# Patient Record
Sex: MALE | Race: WHITE | NOT HISPANIC OR LATINO
[De-identification: names, ages, dates, MRNs, and addresses within clinical notes are randomized per-mention and may not be internally consistent; named-entity substitution may affect disease eponyms.]

---

## 2019-10-15 PROBLEM — Z00.00 ENCOUNTER FOR PREVENTIVE HEALTH EXAMINATION: Status: ACTIVE | Noted: 2019-10-15

## 2019-10-17 ENCOUNTER — APPOINTMENT (OUTPATIENT)
Dept: SURGERY | Facility: CLINIC | Age: 64
End: 2019-10-17
Payer: MEDICAID

## 2019-10-17 VITALS
TEMPERATURE: 98.3 F | DIASTOLIC BLOOD PRESSURE: 70 MMHG | OXYGEN SATURATION: 98 % | SYSTOLIC BLOOD PRESSURE: 129 MMHG | HEIGHT: 67 IN | RESPIRATION RATE: 15 BRPM | HEART RATE: 78 BPM | WEIGHT: 167 LBS | BODY MASS INDEX: 26.21 KG/M2

## 2019-10-17 DIAGNOSIS — Z78.9 OTHER SPECIFIED HEALTH STATUS: ICD-10-CM

## 2019-10-17 DIAGNOSIS — I10 ESSENTIAL (PRIMARY) HYPERTENSION: ICD-10-CM

## 2019-10-17 DIAGNOSIS — Z82.49 FAMILY HISTORY OF ISCHEMIC HEART DISEASE AND OTHER DISEASES OF THE CIRCULATORY SYSTEM: ICD-10-CM

## 2019-10-17 DIAGNOSIS — Z81.8 FAMILY HISTORY OF OTHER MENTAL AND BEHAVIORAL DISORDERS: ICD-10-CM

## 2019-10-17 PROCEDURE — 99204 OFFICE O/P NEW MOD 45 MIN: CPT

## 2019-10-17 RX ORDER — HYDROCHLOROTHIAZIDE 12.5 MG/1
TABLET ORAL
Refills: 0 | Status: ACTIVE | COMMUNITY

## 2019-10-17 RX ORDER — LISINOPRIL 20 MG/1
20 TABLET ORAL
Refills: 0 | Status: ACTIVE | COMMUNITY

## 2019-10-17 NOTE — CONSULT LETTER
[Consult Letter:] : I had the pleasure of evaluating your patient, [unfilled]. [Dear  ___] : Dear  [unfilled], [FreeTextEntry3] : Magdaleno Agudelo M.D. [Please see my note below.] : Please see my note below. [Sincerely,] : Sincerely,

## 2019-10-17 NOTE — ASSESSMENT
[FreeTextEntry1] : I have asked this patient to obtain a dynamic sonogram of the right groin to rule out an occult inguinal hernia.  The small subcutaneous mass in the right thigh is probably consistent with a lipoma

## 2019-10-17 NOTE — PHYSICAL EXAM
[Normal Breath Sounds] : Normal breath sounds [Normal Heart Sounds] : normal heart sounds [Calm] : calm [de-identified] : well-developed well-nourished no acute distress [de-identified] : sclera anicteric [de-identified] : penis and testicles normal [de-identified] : supple without adenopathy [de-identified] : soft and nontender; t here is the suggestion of a very small right inguinal hernia. No hernias are palpated on the left [de-identified] : There is a soft subcutaneous 3 x 1 cm mass in the right upper thigh. [de-identified] : no clubbing cyanosis or edema [de-identified] : lower extremities neurovascularly intact

## 2019-10-17 NOTE — HISTORY OF PRESENT ILLNESS
[de-identified] : This 64-year-old patient complains of moderate right groin pain for about 10 months. The pain is intermittent and sometimes prolonged by heavy physical activity. He also complains of back pain radiating to the right posterior thigh and also paresthesias of the foot.

## 2019-10-24 ENCOUNTER — FORM ENCOUNTER (OUTPATIENT)
Age: 64
End: 2019-10-24

## 2019-10-25 ENCOUNTER — OUTPATIENT (OUTPATIENT)
Dept: OUTPATIENT SERVICES | Facility: HOSPITAL | Age: 64
LOS: 1 days | End: 2019-10-25
Payer: MEDICAID

## 2019-10-25 ENCOUNTER — APPOINTMENT (OUTPATIENT)
Dept: ULTRASOUND IMAGING | Facility: CLINIC | Age: 64
End: 2019-10-25
Payer: MEDICAID

## 2019-10-25 DIAGNOSIS — R10.31 RIGHT LOWER QUADRANT PAIN: ICD-10-CM

## 2019-10-25 PROCEDURE — 76705 ECHO EXAM OF ABDOMEN: CPT

## 2019-10-25 PROCEDURE — 76705 ECHO EXAM OF ABDOMEN: CPT | Mod: 26

## 2020-02-12 ENCOUNTER — APPOINTMENT (OUTPATIENT)
Dept: PAIN MANAGEMENT | Facility: CLINIC | Age: 65
End: 2020-02-12

## 2020-02-27 ENCOUNTER — APPOINTMENT (OUTPATIENT)
Dept: NEUROLOGY | Facility: CLINIC | Age: 65
End: 2020-02-27

## 2020-04-29 ENCOUNTER — APPOINTMENT (OUTPATIENT)
Dept: NEUROLOGY | Facility: CLINIC | Age: 65
End: 2020-04-29
Payer: MEDICARE

## 2020-04-29 DIAGNOSIS — Z86.79 PERSONAL HISTORY OF OTHER DISEASES OF THE CIRCULATORY SYSTEM: ICD-10-CM

## 2020-04-29 DIAGNOSIS — R10.31 RIGHT LOWER QUADRANT PAIN: ICD-10-CM

## 2020-04-29 PROCEDURE — 99205 OFFICE O/P NEW HI 60 MIN: CPT | Mod: 95

## 2020-05-02 PROBLEM — Z86.79 HISTORY OF ESSENTIAL HYPERTENSION: Status: RESOLVED | Noted: 2020-05-02 | Resolved: 2020-05-02

## 2020-05-02 PROBLEM — Z86.79 HISTORY OF RHEUMATIC FEVER: Status: RESOLVED | Noted: 2020-05-02 | Resolved: 2020-05-02

## 2020-05-02 PROBLEM — R10.31 GROIN PAIN, RIGHT: Status: ACTIVE | Noted: 2019-10-17

## 2020-05-02 NOTE — HISTORY OF PRESENT ILLNESS
[Home] : at home, [unfilled] , at the time of the visit. [Medical Office: ([facility])___] : at the medical office located in  [Patient] : the patient [Self] : self [FreeTextEntry4] : Orquidea rodriguez [FreeTextEntry1] : Mr. Gurrola is a 65-year-old  male who consented for telemetric health service and was interviewed from 12 noon to 1:10 PM and was at his residence.\par \par The patient is currently in [place] and gave his address where I sent him the MRI requisition for lumbar spine.\par \par The patient is stated that while he was on the treadmill he experienced severe pain in the right groin which began approximately a day or 2 after the exercise and approximately 4 to 6 weeks later the pain continued to remain in his right leg and he started limping along with numbness in the right leg and foot as well and sharp pain shooting down from posterior buttock to the foot only on the right side and had difficulty in lifting his right leg and flexion of his lumbar spine seems to relieve his pain but ambulation is difficult and he cannot walk beyond half to 1 block and cannot do any significant physical activity.  He denies any symptoms in the left leg and there is no bowel or bladder dysfunction.  There is no local hip or knee joint pain.\par \par Review of systems is unremarkable and he denied any headache diplopia dysarthria dysphagia or dyspnea and there is no cognitive language or memory deficits.  He denied any chest or abdominal symptoms and the soft tissue growth was excised from his right groin area which was nonmalignant and he has no vascular disease.\par \par Patient denied any past medical history other than mild hypertension and history of rheumatic fever at age 5 and did not have any major surgical procedures.\par \par The patient has no toxic habits is  and have 3 children but one committed suicide and his father at age 97 had dementia and mother at age 92  of complications of hypertension and he has no siblings.\par \par I reviewed limited medical records which has no neurological pertinence.General examination is unremarkable he appeared he stated age and is frankly bald and is alert oriented without any memory language or cognitive dysfunction was very pleasant and cooperative and appeared to have good insight and judgment.  Cranial nerve examination revealed normal brisk pupils normal convergence full extraocular movements without nystagmus and normal visual fields and there was no facial weakness tongue movements were normal and palatal arches were maintained.  Neck muscles were normal.  Upper extremity motor strength and bulk appeared normal with normal shoulder and neck joint range of motion with normal coordination and rapid alternating movements and normal perception of touch induced by himself.\par \par Lower extremity evaluation revealed positive straight leg raising test on the right and his right foot tapping was somewhat slow but most of the muscle strength visually evaluated appeared unremarkable.  He was able to toe heel and tandem walk but had difficulty maintaining good stride with a right foot.  When he touched his right leg there was no abnormal sensation but perhaps there was reduced sensation in the dorsum of his right foot.  Lumbar spine range of motion was restricted in flexion and extension but when he pressed his lumbar para spinal muscles there was no elicitation of tenderness.\par \par Opinion–the patient's history is consistent with lumbosacral radiculopathy probably affecting the right L5 and S1 nerve roots with history of neurogenic claudication and was advised to obtain MRI of the lumbar spine as soon as possible and a prescription was mailed to his [facility] address and that he will forward me the report as well as the disc.  I also advised him that if there is any significant worsening of the pain and onset of significant weakness or any bowel or bladder dysfunction to contact me immediately and go to the emergency room in the nearby hospital.  Education and counseling was provided.  All his questions were answered and he was satisfied. Jenae Roman MD

## 2020-05-29 ENCOUNTER — OUTPATIENT (OUTPATIENT)
Dept: OUTPATIENT SERVICES | Facility: HOSPITAL | Age: 65
LOS: 1 days | End: 2020-05-29
Payer: MEDICARE

## 2020-05-29 ENCOUNTER — TRANSCRIPTION ENCOUNTER (OUTPATIENT)
Age: 65
End: 2020-05-29

## 2020-05-29 ENCOUNTER — APPOINTMENT (OUTPATIENT)
Dept: MRI IMAGING | Facility: CLINIC | Age: 65
End: 2020-05-29
Payer: MEDICARE

## 2020-05-29 DIAGNOSIS — M54.16 RADICULOPATHY, LUMBAR REGION: ICD-10-CM

## 2020-05-29 PROCEDURE — 72148 MRI LUMBAR SPINE W/O DYE: CPT

## 2020-05-29 PROCEDURE — 72148 MRI LUMBAR SPINE W/O DYE: CPT | Mod: 26

## 2020-06-03 ENCOUNTER — APPOINTMENT (OUTPATIENT)
Dept: NEUROLOGY | Facility: CLINIC | Age: 65
End: 2020-06-03
Payer: MEDICARE

## 2020-06-03 VITALS
DIASTOLIC BLOOD PRESSURE: 80 MMHG | BODY MASS INDEX: 26.84 KG/M2 | HEIGHT: 67 IN | HEART RATE: 69 BPM | WEIGHT: 171 LBS | SYSTOLIC BLOOD PRESSURE: 151 MMHG

## 2020-06-03 VITALS — TEMPERATURE: 98.7 F

## 2020-06-03 DIAGNOSIS — M54.16 RADICULOPATHY, LUMBAR REGION: ICD-10-CM

## 2020-06-03 PROCEDURE — 95909 NRV CNDJ TST 5-6 STUDIES: CPT

## 2020-06-03 PROCEDURE — 99215 OFFICE O/P EST HI 40 MIN: CPT

## 2020-06-03 PROCEDURE — 95886 MUSC TEST DONE W/N TEST COMP: CPT

## 2020-06-04 PROBLEM — M54.16 LUMBAR RADICULOPATHY, RIGHT: Status: ACTIVE | Noted: 2020-04-29

## 2020-06-04 NOTE — PHYSICAL EXAM
[FreeTextEntry1] : General examination was unremarkable.  There is no carotid bruit, thyromegaly or lymphadenopathy and neck is supple with full range of motion.  Straight leg raising test is negative on the left with minimal positive low back and posterior thigh pain on the right.  Posterior tibial artery pulsations are normal in both legs and there is no significant edema.  Hip joint movements are normal and lumbar spine range of motion is minimally restricted.\par \par Neurological evaluation reveals him to be very pleasant alert oriented without any memory language or cognitive dysfunction and appears to have good insight and judgment.  Cranial nerve examination is normal with normal optic disks brisk pupils full extraocular movements normal visual fields and there is no facial weakness.  Bulbar functions are intact.  Upper extremity motor tone is preserved with symmetric bulk and 5/5 strength with normal 2+ deep tendon reflexes coordination and sensations.\par \par Lower extremity evaluation revealed 4/5 strength of the iliopsoas quadriceps which are minimally weak against resistance whereas he has significant -4/5 strength in the dorsiflexors of the right foot inverters and everters and minimal weakness of the hamstrings.  Plantar flexion appears preserved.  His knee reflexes are trace on the right and 1-1/2+ on the left.  Ankle reflexes absent on the right and 1+ on the left.  There is no Babinski sign.  Sensations are decreased in the right L5 and S1 dermatome and questionably in the right L4 dermatome as well.  There is no sensory level and posterior column sensations are preserved.  He lives with his right leg.

## 2020-06-04 NOTE — REVIEW OF SYSTEMS
[Leg Weakness] : leg weakness [Numbness] : numbness [Tingling] : tingling [As Noted in HPI] : as noted in HPI [Arthralgias] : arthralgias [Negative] : Heme/Lymph

## 2020-06-04 NOTE — HISTORY OF PRESENT ILLNESS
[FreeTextEntry1] : Mr. Bk Gurrola is a 65-year-old  male who was originally evaluated on telehealth services in April 2020 and considering his severity of symptoms I advised him face-to-face evaluation and electrodiagnostic studies including MRI of the lumbar spine.  Today he returns back for electrodiagnostic studies which confirmed chronic lower motor neuron dysfunction in the right lumbar paraspinal muscles including similar changes in most of the L4-5 level muscles on the right without any evidence of neuropathy.  I also reviewed the MRI of lumbar spine though the official report is not available it indicates a severe lumbar spine disease compressing L4 V nerve roots on the right side and the results were discussed with the patient.\par \par Current complaints–patient continues to complain of severe and mostly moderate right paralumbar and gluteal pain radiating to the right lower extremity with right leg weakness and tingling and numbness in the right foot since approximately 1-1/2 years with neurogenic claudication within half to 1 block involving mainly the right leg and cannot lift any weights because of back pain and has some restriction of lumbar spine movements and denied any bowel or bladder dysfunction and there is no erectile dysfunction and denied any left lower extremity symptoms.  He never had any investigations or treatment for last 1-1/2 years.  He has history of hypertension but no surgical history and his personal and family history is noncontributory and was already delineated in my original telehealth services.  I reviewed his medications and allergies.

## 2020-06-04 NOTE — DISCUSSION/SUMMARY
[FreeTextEntry1] : Opinion–the patient has 1-1/2 years history of radicular symptoms in the right leg with clinical, electrophysiological and MRI evidence of compressive radiculopathy predominantly affecting the right L4 and L5 nerve roots without active denervation and was advised that in view of significant progressive symptoms his best option is surgical decompression though other options do remain but they have not been helpful though he may try epidural steroids but multilevel lesions excluded any long-lasting benefit.  He preferred to consider surgical treatment and neurosurgical/orthopedic consultation in [place] as he would require subsequent rehabilitation and care in [place] where he resides.  I understand his concern and advised [facility] to see surgical opinion and discuss the matter with his surgeon in details meanwhile I will forward his consultation report, EMG report and MRI report to facilitate consultation and in the event of any sudden change he is to contact me immediately.  I had a detailed conversation with the patient including risks benefits and long-term issues and he appears to understand and will proceed with my advice.  A medical consultation prior to any surgical treatment would be appropriate and was advised.
none

## 2020-08-25 ENCOUNTER — APPOINTMENT (OUTPATIENT)
Dept: NEUROSURGERY | Facility: CLINIC | Age: 65
End: 2020-08-25
Payer: MEDICARE

## 2020-08-25 ENCOUNTER — OUTPATIENT (OUTPATIENT)
Dept: OUTPATIENT SERVICES | Facility: HOSPITAL | Age: 65
LOS: 1 days | End: 2020-08-25
Payer: MEDICARE

## 2020-08-25 ENCOUNTER — RESULT REVIEW (OUTPATIENT)
Age: 65
End: 2020-08-25

## 2020-08-25 VITALS
DIASTOLIC BLOOD PRESSURE: 75 MMHG | HEIGHT: 67 IN | SYSTOLIC BLOOD PRESSURE: 135 MMHG | TEMPERATURE: 98.6 F | OXYGEN SATURATION: 96 % | HEART RATE: 67 BPM | RESPIRATION RATE: 17 BRPM

## 2020-08-25 DIAGNOSIS — M54.5 LOW BACK PAIN: ICD-10-CM

## 2020-08-25 DIAGNOSIS — Z63.5 DISRUPTION OF FAMILY BY SEPARATION AND DIVORCE: ICD-10-CM

## 2020-08-25 DIAGNOSIS — Z78.9 OTHER SPECIFIED HEALTH STATUS: ICD-10-CM

## 2020-08-25 DIAGNOSIS — Z82.0 FAMILY HISTORY OF EPILEPSY AND OTHER DISEASES OF THE NERVOUS SYSTEM: ICD-10-CM

## 2020-08-25 PROCEDURE — 99205 OFFICE O/P NEW HI 60 MIN: CPT

## 2020-08-25 PROCEDURE — 72110 X-RAY EXAM L-2 SPINE 4/>VWS: CPT

## 2020-08-25 PROCEDURE — 72110 X-RAY EXAM L-2 SPINE 4/>VWS: CPT | Mod: 26

## 2020-08-25 RX ORDER — AMLODIPINE BESYLATE 5 MG/1
5 TABLET ORAL
Refills: 0 | Status: ACTIVE | COMMUNITY

## 2020-08-25 SDOH — SOCIAL STABILITY - SOCIAL INSECURITY: DISRUPTION OF FAMILY BY SEPARATION AND DIVORCE: Z63.5

## 2020-08-25 NOTE — REASON FOR VISIT
[New Patient Visit] : a new patient visit [Referred By: _________] : Patient was referred by GHAZALA [Other: _____] : [unfilled]

## 2020-08-25 NOTE — PHYSICAL EXAM
[General Appearance - In No Acute Distress] : in no acute distress [General Appearance - Alert] : alert [General Appearance - Well Nourished] : well nourished [General Appearance - Well Developed] : well developed [General Appearance - Well-Appearing] : healthy appearing [Oriented To Time, Place, And Person] : oriented to person, place, and time [Memory Recent] : recent memory was not impaired [Impaired Insight] : insight and judgment were intact [Affect] : the affect was normal [Person] : oriented to person [Place] : oriented to place [Time] : oriented to time [Cranial Nerves Optic (II)] : visual acuity intact bilaterally,  pupils equal round and reactive to light [Cranial Nerves Oculomotor (III)] : extraocular motion intact [Cranial Nerves Facial (VII)] : face symmetrical [Cranial Nerves Trigeminal (V)] : facial sensation intact symmetrically [Cranial Nerves Vestibulocochlear (VIII)] : hearing was intact bilaterally [Cranial Nerves Glossopharyngeal (IX)] : tongue and palate midline [Cranial Nerves Accessory (XI - Cranial And Spinal)] : head turning and shoulder shrug symmetric [Motor Handedness Right-Handed] : the patient is right hand dominant [Cranial Nerves Hypoglossal (XII)] : there was no tongue deviation with protrusion [Balance] : balance was intact [Abnormal Walk] : normal gait [Sclera] : the sclera and conjunctiva were normal [Extraocular Movements] : extraocular movements were intact [PERRL With Normal Accommodation] : pupils were equal in size, round, reactive to light, with normal accommodation [Outer Ear] : the ears and nose were normal in appearance [Neck Appearance] : the appearance of the neck was normal [Respiration, Rhythm And Depth] : normal respiratory rhythm and effort [Exaggerated Use Of Accessory Muscles For Inspiration] : no accessory muscle use [Heart Rate And Rhythm] : heart rate was normal and rhythm regular [Motor Tone] : muscle strength and tone were normal [] : no rash [Skin Color & Pigmentation] : normal skin color and pigmentation [Involuntary Movements] : no involuntary movements were seen [2+] : Patella left 2+ [1+] : Ankle jerk left 1+ [Antalgic] : antalgic [Able to toe walk] : the patient was able to toe walk [Able to heel walk] : the patient was able to heel walk [Sensation Tactile Decrease] : light touch was intact [Sensation Pain / Temperature Decrease] : pain and temperature was intact [Limited Balance] : balance was intact [Straight-Leg Raise Test - Left] : straight leg raise of the left leg was negative [Straight-Leg Raise Test - Right] : straight leg raise  of the right leg was negative [FreeTextEntry1] : Painful limitation of right hip movement.

## 2020-08-25 NOTE — HISTORY OF PRESENT ILLNESS
[Other: ___] : [unfilled] [FreeTextEntry1] : "Lower back pain going into the right buttock and thigh" [de-identified] : Bk Gurrola is a pleasant 65 year old white gentleman (Retired  for [employer]) who presents for consultation regarding lower back pain.  He complains of  mostly moderate right lower back and right gluteal pain radiating to the right lower extremity, right groin with occasional RLE weakness, tingling and numbness since approximately 2 years.  He also states that after walking 1 block he has to stop due to pain involving mainly the right leg.  He denies bowel, bladder problems and erectile dysfunction .  He denies any other left lower extremity symptoms. He does admit to right groin pain.\par \par States that he consulted with another spine surgeon who suggested that he see an Orthopedist for a right hip replacement.  He had confirmation from an Orthopedic Surgeon in [place] who agrees that he needs right hip replacement.\par \par He has history of hypertension but no surgical history. Physically active normally.\par \par His PCP is Dr. Blevins, [address] [place] [address] [phone]\par

## 2020-08-25 NOTE — ASSESSMENT
[FreeTextEntry1] : IMPRESSION:\par 1. Degenerative joint disease right hip - this is the primary cause of his current symptoms\par 2. Lumbar spondylosis worst at L4-5 on the right with likely radiculopathy\par \par \par PLAN:\par 1. Although pt may have a pinched nerve, I recommend that patient have right hip surgery first then f/u with me to re-evaluate his lower back. He states that he will likely have this done in [place] before the end of the year.\par 2. X-rays lumbar spine - 4 views..\par 3. If he needs lumbar surgery after his hip is replaced, I am likely to recommend a decompression without a fusion, unless there is instability on flexion-extension views.\par \par Kristopher Ojeda MD, FACS, FAANS\par Professor and \par Department of Neurosurgery\par [facility]\par [address] [facility], [address]\[place] [address]\par [phone] Clinical\par [phone] Academic\par \par \par

## 2020-08-25 NOTE — CONSULT LETTER
[Please see my note below.] : Please see my note below. [Dear  ___] : Dear ~LINDA, [Consult Letter:] : I had the pleasure of evaluating your patient, [unfilled]. [Consult Closing:] : Thank you very much for allowing me to participate in the care of this patient.  If you have any questions, please do not hesitate to contact me. [Sincerely,] : Sincerely, [FreeTextEntry3] : Kristopher Ojeda MD, FACS, FAANS\par Professor and \par Department of Neurosurgery\par [facility]\par [address] [place] [address], [address] [place] [address]\[place] [address]\par [phone] Clinical\par [phone] Academic\par  [FreeTextEntry2] : Rich Burrows MD\par [address] [facility]\[place] [address]

## 2020-08-25 NOTE — DATA REVIEWED
[de-identified] : EXAM: MR SPINE LUMBAR \par \par \par PROCEDURE DATE: 05/29/2020 \par \par \par \par INTERPRETATION: \par LUMBOSACRAL SPINE MRI \par \par CLINICAL INFORMATION: Lower back pain. Right leg pain for 18 months. \par TECHNIQUE: Multiplanar, multisequence MR imaging was obtained of the \par lumbosacral spine. \par COMPARISON: None available. \par FINDINGS: \par \par DISC LEVEL EVALUATION: \par \par T11/T12: Evaluated only in the sagittal plane. No central canal or foraminal \par narrowing. \par T12/L1: Evaluated only in the sagittal plane. No central canal or foraminal \par narrowing. \par L1/L2: Mild facet degenerative change with small right facet effusion. Mild \par disc bulging. Mild effacement the ventral thecal sac. No central canal or \par foraminal narrowing. Lateral recesses are preserved. \par L2/L3: Mild to moderate facet degenerative change of mild bilateral \par foraminal narrowing. Small left foraminal disc osteophyte complex impinges \par upon the exiting left L2 nerve root. There is mild left lateral recess \par narrowing mildly displacing the descending L3 nerve posteriorly. No central \par canal or right foraminal narrowing. \par L3/L4: Posterior osseous ridging and disc bulging. Mild facet degenerative \par change. Mild bilateral foraminal narrowing. No central canal stenosis. \par L4/L5: There is posterior osseous ridging and disc bulging. There is a \par suspected right lateral recess/foraminal disc extrusion contacting the \par descending right L5 nerve root and flattening the right thecal sac. Probable \par contact of the exiting right L4 nerve root. Moderate disc material extends \par into the left lateral recess and foramina mildly narrowing the left lateral \par recess and foramina. Mild abutment of descending left L5 nerve root. No \par central canal stenosis. \par L5/S1: Moderate facet degenerative change. No central canal or foraminal \par narrowing. \par \par SPINAL ALIGNMENT: Mild dextrocurvature. \par Levels of disc space narrowing most severe at L3-4, L4-5. \par DISTAL CORD AND CONUS: The distal spinal cord terminates at the T12-L1 \par level. No abnormal signal seen in the conus. \par SI JOINTS: No articular abnormality. \par MARROW: Rounded focus of increased T1 and T2 signal within the L2 vertebra \par measuring 0.5 cm most consistent with hemangioma. An additional hemangioma \par seen within the L2 vertebra measuring 0.6 cm centrally. There are \par degenerative endplate changes at L3-4 and L4-5. No fracture is seen. \par PARASPINAL MUSCLE AND SOFT TISSUES: Symmetric appearance of paraspinal \par musculature. \par INTRAABDOMINAL/INTRAPELVIC SOFT TISSUES: No abnormality noted. \par \par IMPRESSION: \par \par 1. L4-5: Posterior osseous ridging and disc bulging. Suspect a right \par lateral recess/foraminal disc extrusion contacting the descending right L5 \par nerve root and flattening the right thecal sac. Probable contact of the \par exiting right L4 nerve root. Disc material into left lateral recess effaces \par the ventral thecal sac and abuts the descending left nerve root. \par 2. L2-3: Degenerative change with left foraminal disc osteophyte complex \par impinging upon the exiting left L2 nerve root and displacing descending L3 \par nerve root posteriorly. \par 3. Additional multilevel spondylosis as above. \par \par \par \par \par \par \par \par \par MORENA YAO M.D., ATTENDING RADIOLOGIST \par This document has been electronically signed. May 29 2020 12:14PM \par \par \par \par \par \par  [de-identified] : Pictures of plain x-rays from [place] show severe degenerative changes in the right hip joint.